# Patient Record
Sex: FEMALE | Race: WHITE | NOT HISPANIC OR LATINO | URBAN - NONMETROPOLITAN AREA
[De-identification: names, ages, dates, MRNs, and addresses within clinical notes are randomized per-mention and may not be internally consistent; named-entity substitution may affect disease eponyms.]

---

## 2016-06-09 PROBLEM — H25.13: Noted: 2019-05-31

## 2016-06-09 PROBLEM — H02.413: Noted: 2021-04-24

## 2019-05-31 ENCOUNTER — IMPORTED ENCOUNTER (OUTPATIENT)
Dept: URBAN - NONMETROPOLITAN AREA CLINIC 1 | Facility: CLINIC | Age: 54
End: 2019-05-31

## 2019-05-31 PROCEDURE — V2520 CONTACT LENS HYDROPHILIC: HCPCS

## 2019-05-31 PROCEDURE — S0621 ROUTINE OPHTHALMOLOGICAL EXA: HCPCS

## 2019-05-31 PROCEDURE — 92310 CONTACT LENS FITTING OU: CPT

## 2019-05-31 NOTE — PATIENT DISCUSSION
Compound Myopic Astigmatism OU w/Presbyopia-  discussed findings w/patient-  new spectacle/CL Rx issued-  patient interested in LASIK discussed pros and cons of LASIK will re-evaluate next year-  monitor yearly or prn Nuclear Sclerosis OU-  discussed findings w/patient-  no treatment indicated at this time-  UV protection recommended-  monitor yearly or prn; 's Notes: MR 5/31/2019DFE 5/3/2019

## 2021-04-22 ENCOUNTER — IMPORTED ENCOUNTER (OUTPATIENT)
Dept: URBAN - NONMETROPOLITAN AREA CLINIC 1 | Facility: CLINIC | Age: 56
End: 2021-04-22

## 2021-04-22 PROCEDURE — S0621 ROUTINE OPHTHALMOLOGICAL EXA: HCPCS

## 2021-04-22 PROCEDURE — 92310 CONTACT LENS FITTING OU: CPT

## 2021-04-22 NOTE — PATIENT DISCUSSION
Compound Myopic Astigmatism OU w/Presbyopia-  discussed findings w/patient-  new spectacle/CL Rx issued-  monitor yearly or prn Nuclear Sclerosis OU-  discussed findings w/patient-  no treatment indicated at this time-  UV protection recommended-  monitor yearly or prn Ptosis OU-  discussed findings w/patient-  worsening vision superiorly OU-  refer to [address] [place] [address] for further eval; 's Notes:  4/22/2021D 4/22/2021

## 2021-07-27 ENCOUNTER — IMPORTED ENCOUNTER (OUTPATIENT)
Dept: URBAN - NONMETROPOLITAN AREA CLINIC 1 | Facility: CLINIC | Age: 56
End: 2021-07-27

## 2021-07-27 PROCEDURE — 99214 OFFICE O/P EST MOD 30 MIN: CPT

## 2021-07-27 NOTE — PATIENT DISCUSSION
Ptosis-Ptosis (the upper eyelid being in a lower than normal position) of the upper eyelid was explained to the patient. -RBAs of ptosis repair discussed with patient. Treatment options include observation or surgical correction.-Will order ptosis VF and external photos and review results with patient. -MRD1- 0.5 U-BLF 14 OU - (-) LAG OU - TBUT 12 secs OD 14 sec OS - Valium RX not given today - Task sent to david to call and schedule; 's Notes: MR 4/22/2021DFE 4/22/2021

## 2021-07-28 PROBLEM — H25.13: Noted: 2021-07-28

## 2021-07-28 PROBLEM — H02.413: Noted: 2021-07-28

## 2021-07-28 PROBLEM — H52.13: Noted: 2021-07-28

## 2021-07-28 PROBLEM — H52.4: Noted: 2021-07-28

## 2021-07-28 PROBLEM — H52.223: Noted: 2021-07-28

## 2021-09-22 ENCOUNTER — IMPORTED ENCOUNTER (OUTPATIENT)
Dept: URBAN - NONMETROPOLITAN AREA CLINIC 1 | Facility: CLINIC | Age: 56
End: 2021-09-22

## 2021-09-22 PROCEDURE — 92083 EXTENDED VISUAL FIELD XM: CPT

## 2022-04-10 ASSESSMENT — VISUAL ACUITY
OS_CC: J1+
OS_PH: 20/30
OU_CC: J1+
OU_SC: 20/25+

## 2022-04-10 ASSESSMENT — TONOMETRY
OS_IOP_MMHG: 12
OD_IOP_MMHG: 14
OD_IOP_MMHG: 14
OS_IOP_MMHG: 14
OS_IOP_MMHG: 15
OD_IOP_MMHG: 14

## 2024-01-03 ENCOUNTER — COMPREHENSIVE EXAM (OUTPATIENT)
Dept: URBAN - NONMETROPOLITAN AREA CLINIC 4 | Facility: CLINIC | Age: 59
End: 2024-01-03

## 2024-01-03 DIAGNOSIS — H52.223: ICD-10-CM

## 2024-01-03 DIAGNOSIS — H52.4: ICD-10-CM

## 2024-01-03 DIAGNOSIS — H25.13: ICD-10-CM

## 2024-01-03 DIAGNOSIS — H52.13: ICD-10-CM

## 2024-01-03 PROCEDURE — 92310-E CONTACT LENS FITTING ESTABLISH PATIENT

## 2024-01-03 PROCEDURE — S0621AEC ROUTINE OPH EXAM INCLUDES REF/ EST PATIENT

## 2024-01-03 ASSESSMENT — VISUAL ACUITY
OD_CC: 20/20-2
OS_CC: J1

## 2024-01-03 ASSESSMENT — TONOMETRY
OD_IOP_MMHG: 14
OS_IOP_MMHG: 14

## 2025-01-14 ENCOUNTER — COMPREHENSIVE EXAM (OUTPATIENT)
Age: 60
End: 2025-01-14

## 2025-01-14 DIAGNOSIS — H52.4: ICD-10-CM

## 2025-01-14 DIAGNOSIS — H52.13: ICD-10-CM

## 2025-01-14 DIAGNOSIS — H25.13: ICD-10-CM

## 2025-01-14 DIAGNOSIS — H52.223: ICD-10-CM

## 2025-01-14 PROCEDURE — 92310-2 LEVEL 2 SOFT LENS UPDATE

## 2025-01-14 PROCEDURE — S0621AEC ROUTINE OPH EXAM INCLUDES REF/ EST PATIENT
